# Patient Record
Sex: FEMALE | Race: BLACK OR AFRICAN AMERICAN
[De-identification: names, ages, dates, MRNs, and addresses within clinical notes are randomized per-mention and may not be internally consistent; named-entity substitution may affect disease eponyms.]

---

## 2017-08-24 ENCOUNTER — HOSPITAL ENCOUNTER (EMERGENCY)
Dept: HOSPITAL 23 - CED | Age: 27
Discharge: HOME | End: 2017-08-24
Payer: COMMERCIAL

## 2017-08-24 VITALS — WEIGHT: 189.99 LBS | HEIGHT: 64 IN | BODY MASS INDEX: 32.44 KG/M2

## 2017-08-24 DIAGNOSIS — Z79.2: ICD-10-CM

## 2017-08-24 DIAGNOSIS — Z3A.22: ICD-10-CM

## 2017-08-24 DIAGNOSIS — O99.332: ICD-10-CM

## 2017-08-24 DIAGNOSIS — F17.210: ICD-10-CM

## 2017-08-24 DIAGNOSIS — K80.20: ICD-10-CM

## 2017-08-24 DIAGNOSIS — Z79.899: ICD-10-CM

## 2017-08-24 DIAGNOSIS — O99.612: Primary | ICD-10-CM

## 2017-08-24 DIAGNOSIS — I10: ICD-10-CM

## 2017-08-24 LAB
AMYLASE: 19 U/L (ref 0–46)
BARBITURATES UR QL SCN: 0.2 MG/DL (ref 0.2–2)
BARBITURATES UR QL SCN: 3.4 G/DL (ref 3.5–5)
BASOPHIL#: 0 X10E3 (ref 0–0.3)
BASOPHIL%: 0.5 % (ref 0–2.5)
BENZODIAZ UR QL SCN: 30 U/L (ref 10–40)
BENZODIAZ UR QL SCN: 32 U/L (ref 10–42)
BLOOD UREA NITROGEN: 8 MG/DL (ref 9–23)
BUN/CREATININE RATIO: 13.33
BZE UR QL SCN: 40 U/L (ref 32–92)
CALCIUM SERUM: 8.5 MG/DL (ref 8.4–10.2)
CK MB SERPL-RTO: 14.2 % (ref 11–15.5)
CK MB SERPL-RTO: 33 G/DL (ref 30–36)
CREATININE SERUM: 0.6 MG/DL (ref 0.6–1.4)
DIFF IND: NO
EOSINOPHIL#: 0.1 X10E3 (ref 0–0.7)
EOSINOPHIL%: 1.7 % (ref 0–7)
GENTAMICIN PEAK SERPL-MCNC: NO MG/L
GLOM FILT RATE ESTIMATED: 144.8 ML/MIN (ref 60–?)
GLUCOSE FASTING: 82 MG/DL (ref 70–110)
HEMATOCRIT: 33.8 % (ref 35–45)
HEMOGLOBIN: 11.2 GM/DL (ref 12–16)
KETONES UR QL: 108 MMOL/L (ref 100–111)
KETONES UR QL: 25 MMOL/L (ref 22–31)
LIPASE: 20 U/L (ref 22–51)
LYMPHOCYTE#: 1.7 X10E3 (ref 1–3.5)
LYMPHOCYTE%: 29.9 % (ref 17–45)
MEAN CELL VOLUME: 90.6 FL (ref 83–96)
MEAN CORPUSCULAR HEMOGLOBIN: 29.9 PG (ref 28–34)
MEAN PLATELET VOLUME: 9 FL (ref 6.5–11.5)
MONOCYTE#: 0.5 X10E3 (ref 0–1)
MONOCYTE%: 8 % (ref 3–12)
NEUTROPHIL#: 3.4 X10E3 (ref 1.5–7.1)
NEUTROPHIL%: 59.9 % (ref 40–75)
PLATELET COUNT: 208 X10E3 (ref 140–420)
POTASSIUM: 3.6 MMOL/L (ref 3.5–5.1)
PROTEIN TOTAL SERUM: 6.5 G/DL (ref 6–8.3)
RED BLOOD COUNT: 3.74 X10E (ref 3.9–5.3)
SODIUM: 141 MMOL/L (ref 135–145)
URINE APPEARANCE: CLEAR
URINE BILIRUBIN: (no result)
URINE BLOOD: (no result)
URINE COLOR: YELLOW
URINE GLUCOSE: (no result) MG/DL
URINE KETONE: (no result)
URINE LEUKOCYTE ESTERASE: (no result)
URINE NITRATE: (no result)
URINE PH: 6.5 (ref 5–8)
URINE PROTEIN: (no result)
URINE SOURCE: (no result)
URINE SPECIFIC GRAVITY: 1.01 (ref 1–1.03)
URINE UROBILINOGEN: 0.2 MG/DL
WHITE BLOOD COUNT: 5.7 X10E3 (ref 4–10.5)

## 2017-12-11 ENCOUNTER — ANESTHESIA EVENT (OUTPATIENT)
Dept: LABOR AND DELIVERY | Facility: HOSPITAL | Age: 27
End: 2017-12-11

## 2017-12-11 ENCOUNTER — HOSPITAL ENCOUNTER (INPATIENT)
Facility: HOSPITAL | Age: 27
LOS: 2 days | Discharge: HOME | End: 2017-12-13
Attending: OBSTETRICS & GYNECOLOGY | Admitting: OBSTETRICS & GYNECOLOGY

## 2017-12-11 ENCOUNTER — ANESTHESIA (OUTPATIENT)
Dept: LABOR AND DELIVERY | Facility: HOSPITAL | Age: 27
End: 2017-12-11

## 2017-12-11 DIAGNOSIS — O60.03 PRETERM LABOR IN THIRD TRIMESTER WITHOUT DELIVERY: Primary | ICD-10-CM

## 2017-12-11 PROBLEM — O09.33 INSUFFICIENT PRENATAL CARE IN THIRD TRIMESTER: Status: ACTIVE | Noted: 2017-12-11

## 2017-12-11 PROBLEM — O99.323 DRUG USE AFFECTING PREGNANCY IN THIRD TRIMESTER: Status: ACTIVE | Noted: 2017-12-11

## 2017-12-11 LAB
ABO GROUP BLD: NORMAL
AMPHET+METHAMPHET UR QL: POSITIVE
BARBITURATES UR QL SCN: NEGATIVE
BASOPHILS # BLD AUTO: 0.01 10*3/MM3 (ref 0–0.2)
BASOPHILS NFR BLD AUTO: 0.1 % (ref 0–1.5)
BENZODIAZ UR QL SCN: NEGATIVE
BLD GP AB SCN SERPL QL: NEGATIVE
CANNABINOIDS SERPL QL: POSITIVE
COCAINE UR QL: NEGATIVE
DEPRECATED RDW RBC AUTO: 42 FL (ref 37–54)
EOSINOPHIL # BLD AUTO: 0.01 10*3/MM3 (ref 0–0.7)
EOSINOPHIL NFR BLD AUTO: 0.1 % (ref 0.3–6.2)
ERYTHROCYTE [DISTWIDTH] IN BLOOD BY AUTOMATED COUNT: 13 % (ref 11.7–13)
HBV SURFACE AG SERPL QL IA: NORMAL
HCT VFR BLD AUTO: 36.5 % (ref 35.6–45.5)
HCV AB SER DONR QL: NORMAL
HGB BLD-MCNC: 12.4 G/DL (ref 11.9–15.5)
HIV1 P24 AG SER QL: NORMAL
HIV1+2 AB SER QL: NORMAL
IMM GRANULOCYTES # BLD: 0.02 10*3/MM3 (ref 0–0.03)
IMM GRANULOCYTES NFR BLD: 0.2 % (ref 0–0.5)
LYMPHOCYTES # BLD AUTO: 1.8 10*3/MM3 (ref 0.9–4.8)
LYMPHOCYTES NFR BLD AUTO: 20 % (ref 19.6–45.3)
MCH RBC QN AUTO: 30.3 PG (ref 26.9–32)
MCHC RBC AUTO-ENTMCNC: 34 G/DL (ref 32.4–36.3)
MCV RBC AUTO: 89.2 FL (ref 80.5–98.2)
METHADONE UR QL SCN: NEGATIVE
MONOCYTES # BLD AUTO: 0.66 10*3/MM3 (ref 0.2–1.2)
MONOCYTES NFR BLD AUTO: 7.3 % (ref 5–12)
NEUTROPHILS # BLD AUTO: 6.5 10*3/MM3 (ref 1.9–8.1)
NEUTROPHILS NFR BLD AUTO: 72.3 % (ref 42.7–76)
OPIATES UR QL: POSITIVE
OXYCODONE UR QL SCN: NEGATIVE
PLATELET # BLD AUTO: 256 10*3/MM3 (ref 140–500)
PMV BLD AUTO: 11.3 FL (ref 6–12)
RBC # BLD AUTO: 4.09 10*6/MM3 (ref 3.9–5.2)
RH BLD: POSITIVE
WBC NRBC COR # BLD: 9 10*3/MM3 (ref 4.5–10.7)

## 2017-12-11 PROCEDURE — G0432 EIA HIV-1/HIV-2 SCREEN: HCPCS | Performed by: OBSTETRICS & GYNECOLOGY

## 2017-12-11 PROCEDURE — 80307 DRUG TEST PRSMV CHEM ANLYZR: CPT | Performed by: OBSTETRICS & GYNECOLOGY

## 2017-12-11 PROCEDURE — 87899 AGENT NOS ASSAY W/OPTIC: CPT | Performed by: OBSTETRICS & GYNECOLOGY

## 2017-12-11 PROCEDURE — 85025 COMPLETE CBC W/AUTO DIFF WBC: CPT | Performed by: OBSTETRICS & GYNECOLOGY

## 2017-12-11 PROCEDURE — 59025 FETAL NON-STRESS TEST: CPT

## 2017-12-11 PROCEDURE — 86803 HEPATITIS C AB TEST: CPT | Performed by: OBSTETRICS & GYNECOLOGY

## 2017-12-11 PROCEDURE — 10907ZC DRAINAGE OF AMNIOTIC FLUID, THERAPEUTIC FROM PRODUCTS OF CONCEPTION, VIA NATURAL OR ARTIFICIAL OPENING: ICD-10-PCS | Performed by: OBSTETRICS & GYNECOLOGY

## 2017-12-11 PROCEDURE — 25010000002 PENICILLIN G POTASSIUM PER 600000 UNITS: Performed by: OBSTETRICS & GYNECOLOGY

## 2017-12-11 PROCEDURE — 59409 OBSTETRICAL CARE: CPT | Performed by: OBSTETRICS & GYNECOLOGY

## 2017-12-11 PROCEDURE — 87340 HEPATITIS B SURFACE AG IA: CPT | Performed by: OBSTETRICS & GYNECOLOGY

## 2017-12-11 PROCEDURE — 86850 RBC ANTIBODY SCREEN: CPT | Performed by: OBSTETRICS & GYNECOLOGY

## 2017-12-11 PROCEDURE — C1755 CATHETER, INTRASPINAL: HCPCS | Performed by: ANESTHESIOLOGY

## 2017-12-11 PROCEDURE — 88307 TISSUE EXAM BY PATHOLOGIST: CPT

## 2017-12-11 PROCEDURE — 86901 BLOOD TYPING SEROLOGIC RH(D): CPT | Performed by: OBSTETRICS & GYNECOLOGY

## 2017-12-11 PROCEDURE — 86900 BLOOD TYPING SEROLOGIC ABO: CPT | Performed by: OBSTETRICS & GYNECOLOGY

## 2017-12-11 PROCEDURE — 80081 OBSTETRIC PANEL INC HIV TSTG: CPT | Performed by: OBSTETRICS & GYNECOLOGY

## 2017-12-11 PROCEDURE — 86592 SYPHILIS TEST NON-TREP QUAL: CPT | Performed by: OBSTETRICS & GYNECOLOGY

## 2017-12-11 RX ORDER — ACETAMINOPHEN 325 MG/1
650 TABLET ORAL EVERY 4 HOURS PRN
Status: DISCONTINUED | OUTPATIENT
Start: 2017-12-11 | End: 2017-12-13 | Stop reason: HOSPADM

## 2017-12-11 RX ORDER — DOCUSATE SODIUM 100 MG/1
100 CAPSULE, LIQUID FILLED ORAL 2 TIMES DAILY
Status: DISCONTINUED | OUTPATIENT
Start: 2017-12-11 | End: 2017-12-13 | Stop reason: HOSPADM

## 2017-12-11 RX ORDER — FAMOTIDINE 10 MG/ML
20 INJECTION, SOLUTION INTRAVENOUS EVERY 12 HOURS SCHEDULED
Status: DISCONTINUED | OUTPATIENT
Start: 2017-12-11 | End: 2017-12-11 | Stop reason: HOSPADM

## 2017-12-11 RX ORDER — ACETAMINOPHEN 325 MG/1
650 TABLET ORAL EVERY 6 HOURS PRN
COMMUNITY
End: 2017-12-13 | Stop reason: HOSPADM

## 2017-12-11 RX ORDER — DIPHENHYDRAMINE HCL 25 MG
25 CAPSULE ORAL NIGHTLY PRN
Status: DISCONTINUED | OUTPATIENT
Start: 2017-12-11 | End: 2017-12-11 | Stop reason: HOSPADM

## 2017-12-11 RX ORDER — LIDOCAINE HYDROCHLORIDE AND EPINEPHRINE 15; 5 MG/ML; UG/ML
INJECTION, SOLUTION EPIDURAL AS NEEDED
Status: DISCONTINUED | OUTPATIENT
Start: 2017-12-11 | End: 2017-12-11 | Stop reason: SURG

## 2017-12-11 RX ORDER — IBUPROFEN 800 MG/1
800 TABLET ORAL EVERY 8 HOURS
Status: DISCONTINUED | OUTPATIENT
Start: 2017-12-11 | End: 2017-12-13 | Stop reason: HOSPADM

## 2017-12-11 RX ORDER — OXYTOCIN/RINGER'S LACTATE 30/500 ML
125 PLASTIC BAG, INJECTION (ML) INTRAVENOUS CONTINUOUS PRN
Status: DISCONTINUED | OUTPATIENT
Start: 2017-12-11 | End: 2017-12-11

## 2017-12-11 RX ORDER — CARBOPROST TROMETHAMINE 250 UG/ML
250 INJECTION, SOLUTION INTRAMUSCULAR AS NEEDED
Status: DISCONTINUED | OUTPATIENT
Start: 2017-12-11 | End: 2017-12-13 | Stop reason: HOSPADM

## 2017-12-11 RX ORDER — ONDANSETRON 4 MG/1
4 TABLET, FILM COATED ORAL EVERY 6 HOURS PRN
Status: DISCONTINUED | OUTPATIENT
Start: 2017-12-11 | End: 2017-12-11 | Stop reason: HOSPADM

## 2017-12-11 RX ORDER — SODIUM CHLORIDE 0.9 % (FLUSH) 0.9 %
1-10 SYRINGE (ML) INJECTION AS NEEDED
Status: DISCONTINUED | OUTPATIENT
Start: 2017-12-11 | End: 2017-12-11 | Stop reason: HOSPADM

## 2017-12-11 RX ORDER — LANOLIN 100 %
OINTMENT (GRAM) TOPICAL
Status: DISCONTINUED | OUTPATIENT
Start: 2017-12-11 | End: 2017-12-13 | Stop reason: HOSPADM

## 2017-12-11 RX ORDER — OXYTOCIN/RINGER'S LACTATE 30/500 ML
PLASTIC BAG, INJECTION (ML) INTRAVENOUS
Status: DISPENSED
Start: 2017-12-11 | End: 2017-12-12

## 2017-12-11 RX ORDER — LIDOCAINE HYDROCHLORIDE 15 MG/ML
INJECTION, SOLUTION EPIDURAL; INFILTRATION; INTRACAUDAL; PERINEURAL AS NEEDED
Status: DISCONTINUED | OUTPATIENT
Start: 2017-12-11 | End: 2017-12-11 | Stop reason: SURG

## 2017-12-11 RX ORDER — ACETAMINOPHEN 325 MG/1
650 TABLET ORAL EVERY 4 HOURS PRN
Status: DISCONTINUED | OUTPATIENT
Start: 2017-12-11 | End: 2017-12-11 | Stop reason: HOSPADM

## 2017-12-11 RX ORDER — ONDANSETRON 4 MG/1
4 TABLET, ORALLY DISINTEGRATING ORAL EVERY 6 HOURS PRN
Status: DISCONTINUED | OUTPATIENT
Start: 2017-12-11 | End: 2017-12-13 | Stop reason: HOSPADM

## 2017-12-11 RX ORDER — BISACODYL 10 MG
10 SUPPOSITORY, RECTAL RECTAL DAILY PRN
Status: DISCONTINUED | OUTPATIENT
Start: 2017-12-12 | End: 2017-12-13 | Stop reason: HOSPADM

## 2017-12-11 RX ORDER — METHYLERGONOVINE MALEATE 0.2 MG/ML
200 INJECTION INTRAVENOUS ONCE AS NEEDED
Status: DISCONTINUED | OUTPATIENT
Start: 2017-12-11 | End: 2017-12-11 | Stop reason: SDUPTHER

## 2017-12-11 RX ORDER — MISOPROSTOL 200 UG/1
800 TABLET ORAL AS NEEDED
Status: DISCONTINUED | OUTPATIENT
Start: 2017-12-11 | End: 2017-12-13 | Stop reason: HOSPADM

## 2017-12-11 RX ORDER — ERYTHROMYCIN 5 MG/G
OINTMENT OPHTHALMIC
Status: DISPENSED
Start: 2017-12-11 | End: 2017-12-12

## 2017-12-11 RX ORDER — LIDOCAINE HYDROCHLORIDE 10 MG/ML
5 INJECTION, SOLUTION INFILTRATION; PERINEURAL AS NEEDED
Status: DISCONTINUED | OUTPATIENT
Start: 2017-12-11 | End: 2017-12-11 | Stop reason: HOSPADM

## 2017-12-11 RX ORDER — TERBUTALINE SULFATE 1 MG/ML
0.25 INJECTION, SOLUTION SUBCUTANEOUS AS NEEDED
Status: DISCONTINUED | OUTPATIENT
Start: 2017-12-11 | End: 2017-12-11 | Stop reason: HOSPADM

## 2017-12-11 RX ORDER — DIPHENHYDRAMINE HYDROCHLORIDE 50 MG/ML
25 INJECTION INTRAMUSCULAR; INTRAVENOUS NIGHTLY PRN
Status: DISCONTINUED | OUTPATIENT
Start: 2017-12-11 | End: 2017-12-11 | Stop reason: HOSPADM

## 2017-12-11 RX ORDER — OXYTOCIN/RINGER'S LACTATE 30/500 ML
999 PLASTIC BAG, INJECTION (ML) INTRAVENOUS ONCE
Status: DISCONTINUED | OUTPATIENT
Start: 2017-12-11 | End: 2017-12-11

## 2017-12-11 RX ORDER — ONDANSETRON 2 MG/ML
4 INJECTION INTRAMUSCULAR; INTRAVENOUS EVERY 6 HOURS PRN
Status: DISCONTINUED | OUTPATIENT
Start: 2017-12-11 | End: 2017-12-13 | Stop reason: HOSPADM

## 2017-12-11 RX ORDER — ONDANSETRON 2 MG/ML
4 INJECTION INTRAMUSCULAR; INTRAVENOUS EVERY 6 HOURS PRN
Status: DISCONTINUED | OUTPATIENT
Start: 2017-12-11 | End: 2017-12-11 | Stop reason: HOSPADM

## 2017-12-11 RX ORDER — HYDROCODONE BITARTRATE AND ACETAMINOPHEN 5; 325 MG/1; MG/1
1 TABLET ORAL EVERY 4 HOURS PRN
Status: DISCONTINUED | OUTPATIENT
Start: 2017-12-11 | End: 2017-12-13 | Stop reason: HOSPADM

## 2017-12-11 RX ORDER — ONDANSETRON 4 MG/1
4 TABLET, FILM COATED ORAL EVERY 6 HOURS PRN
Status: DISCONTINUED | OUTPATIENT
Start: 2017-12-11 | End: 2017-12-13 | Stop reason: HOSPADM

## 2017-12-11 RX ORDER — PHYTONADIONE 1 MG/.5ML
INJECTION, EMULSION INTRAMUSCULAR; INTRAVENOUS; SUBCUTANEOUS
Status: DISPENSED
Start: 2017-12-11 | End: 2017-12-12

## 2017-12-11 RX ORDER — PNV NO.95/FERROUS FUM/FOLIC AC 28MG-0.8MG
TABLET ORAL
COMMUNITY

## 2017-12-11 RX ORDER — ONDANSETRON 4 MG/1
4 TABLET, ORALLY DISINTEGRATING ORAL EVERY 6 HOURS PRN
Status: DISCONTINUED | OUTPATIENT
Start: 2017-12-11 | End: 2017-12-11 | Stop reason: HOSPADM

## 2017-12-11 RX ORDER — FAMOTIDINE 20 MG/1
20 TABLET, FILM COATED ORAL EVERY 12 HOURS SCHEDULED
Status: DISCONTINUED | OUTPATIENT
Start: 2017-12-11 | End: 2017-12-11 | Stop reason: HOSPADM

## 2017-12-11 RX ORDER — HYDROCODONE BITARTRATE AND ACETAMINOPHEN 5; 325 MG/1; MG/1
2 TABLET ORAL EVERY 4 HOURS PRN
Status: DISCONTINUED | OUTPATIENT
Start: 2017-12-11 | End: 2017-12-13 | Stop reason: HOSPADM

## 2017-12-11 RX ORDER — ZOLPIDEM TARTRATE 5 MG/1
5 TABLET ORAL NIGHTLY PRN
Status: DISCONTINUED | OUTPATIENT
Start: 2017-12-11 | End: 2017-12-13 | Stop reason: HOSPADM

## 2017-12-11 RX ORDER — SODIUM CHLORIDE, SODIUM LACTATE, POTASSIUM CHLORIDE, CALCIUM CHLORIDE 600; 310; 30; 20 MG/100ML; MG/100ML; MG/100ML; MG/100ML
125 INJECTION, SOLUTION INTRAVENOUS CONTINUOUS
Status: DISCONTINUED | OUTPATIENT
Start: 2017-12-11 | End: 2017-12-11

## 2017-12-11 RX ORDER — PENICILLIN G 3000000 [IU]/50ML
3 INJECTION, SOLUTION INTRAVENOUS EVERY 4 HOURS
Status: DISCONTINUED | OUTPATIENT
Start: 2017-12-11 | End: 2017-12-11 | Stop reason: HOSPADM

## 2017-12-11 RX ORDER — SODIUM CHLORIDE 0.9 % (FLUSH) 0.9 %
1-10 SYRINGE (ML) INJECTION AS NEEDED
Status: DISCONTINUED | OUTPATIENT
Start: 2017-12-11 | End: 2017-12-13 | Stop reason: HOSPADM

## 2017-12-11 RX ORDER — METHYLERGONOVINE MALEATE 0.2 MG/ML
200 INJECTION INTRAVENOUS ONCE AS NEEDED
Status: DISCONTINUED | OUTPATIENT
Start: 2017-12-11 | End: 2017-12-13 | Stop reason: HOSPADM

## 2017-12-11 RX ADMIN — LIDOCAINE HYDROCHLORIDE 5 ML: 15 INJECTION, SOLUTION EPIDURAL; INFILTRATION; INTRACAUDAL; PERINEURAL at 13:41

## 2017-12-11 RX ADMIN — LIDOCAINE HYDROCHLORIDE 5 ML: 15 INJECTION, SOLUTION EPIDURAL; INFILTRATION; INTRACAUDAL; PERINEURAL at 13:37

## 2017-12-11 RX ADMIN — HYDROCODONE BITARTRATE AND ACETAMINOPHEN 2 TABLET: 5; 325 TABLET ORAL at 21:29

## 2017-12-11 RX ADMIN — LIDOCAINE HYDROCHLORIDE 5 ML: 15 INJECTION, SOLUTION EPIDURAL; INFILTRATION; INTRACAUDAL; PERINEURAL at 13:44

## 2017-12-11 RX ADMIN — SODIUM CHLORIDE 5 MILLION UNITS: 900 INJECTION INTRAVENOUS at 13:25

## 2017-12-11 RX ADMIN — LIDOCAINE HYDROCHLORIDE AND EPINEPHRINE 3 ML: 15; 5 INJECTION, SOLUTION EPIDURAL at 13:34

## 2017-12-11 RX ADMIN — IBUPROFEN 800 MG: 800 TABLET ORAL at 18:13

## 2017-12-11 RX ADMIN — DOCUSATE SODIUM 100 MG: 100 CAPSULE, LIQUID FILLED ORAL at 18:13

## 2017-12-11 RX ADMIN — HYDROCODONE BITARTRATE AND ACETAMINOPHEN 2 TABLET: 5; 325 TABLET ORAL at 16:50

## 2017-12-11 NOTE — H&P
CC: Contractions   History of present illness: Patient presents complaining of painful contractions.  Patient reports that she has not seen a doctor for about the last 4 weeks.  She reports that she was dismissed from the care of Dr. Marek Hurt, due to noncompliance with her appointments.  Our service is on call for unreferred patient that present in emergency situations.  In reviewing the Epic system both here and at [facility]; however, I am unable to see the patient actually had any visits with Dr. Hurt.  The only encounters noted in the [facility] system are for the patient's treatment of opioid withdrawal by Dr. Chintan archibald.  The patient initially did not discuss any of these issues, but upon further questioning she did immediate to marijuana and drug use earlier in the pregnancy.  She reports that she only took Subutex for about a week.  She reports that she has not taken any Subutex or any other illicit substances since then.    OB History    Para Term  AB Living   3 2 2   2   SAB TAB Ectopic Multiple Live Births       2      # Outcome Date GA Lbr Osmani/2nd Weight Sex Delivery Anes PTL Lv   3 Current            2 Term 12 41w0d  3572 g (7 lb 14 oz) F Vag-Spont EPI N YON   1 Term 01/02/10 38w0d  3289 g (7 lb 4 oz) M Vag-Spont EPI N YON        Med History: Unclear.  In reviewing the records from [facility], the patient had previously taking lisinopril for chronic hypertension and metformin for diabetes, but she reports that she has not needed them during this pregnancy.  She does appear to have a likely history of chronic history and diabetes.    History reviewed. No pertinent surgical history.     History reviewed. No pertinent family history.    Social History   Substance Use Topics   • Smoking status: Current Every Day Smoker     Packs/day: 0.50   • Smokeless tobacco: None   • Alcohol use No     No current facility-administered medications on file prior to encounter.       No current outpatient prescriptions on file prior to encounter.     No Known Allergies     ROS:   General: No fever or chills  Constitutional: No weight loss or gain, no hair loss  HENT: No headache, no hearing loss, no tinnitus  Eyes: normal vision, no eye pain  Lungs: No cough, no shortness of breath  Heart: No chest pain, no palpitations  Abdomen: No nausea, vomiting, constipation or diarrhea  : No dysuria, no hematuria  Skin: No rashes  Lymph: No swelling  Neuro: No parathesia, no weakness  Psych: Normal though content, no hallucinations, no SI/HI    PE:   Vitals:    17 1355 17 1400 17 1405 17 1410   BP: 120/54 121/52 129/57 130/63   BP Location:    Right arm   Patient Position:    Sitting   Pulse: 83 88 90 92   Resp:    16   Temp:    97.5 °F (36.4 °C)   TempSrc:    Oral   Height:       General: Patient in significant pain with contractions, awake and oriented ×3  Lungs: Clear to auscultation bilaterally  Cardiovascular: Regular rate and rhythm  Abdomen: Soft, nontender, nondistended, normoactive bowel sounds  Cervix: 9 cm dilated, 90% effaced, -1 station  Extremities: No Tenderness, no Homans sign, trace lower extremity edema    Assessment:  1.  27-year-old  3 para 1 at 36-6/7 weeks gestational age by an 8 week ultrasound at [facility] in active  labor  2.  Insufficient prenatal care  3.  Drug use during pregnancy  4.  GBS unknown  5.  Fetal status reassuring  6.  Possible history of chronic hypertension and diabetes    Plan:  1.  The patient is admitted for active labor.  She appears to be progressing rapidly through labor.  We're attempting to obtain an epidural as per the patient's request.  I have started penicillin for unknown GBS status and gestational age of 36-6/7 weeks.  I attempted to order betamethasone; however, I do not suspect it'll be time to administer this medication and get any significant benefits.  Anticipate vaginal delivery.

## 2017-12-11 NOTE — OP NOTE
Delivery Note    Obstetrician:   Sven Cuevas MD      Pre-Delivery Diagnosis:labor at 36-6/7 weeks    Post-Delivery Diagnosis: Same    Procedure: Spontaneous vaginal delivery         Indications for instrumental delivery: none    Infant Wt:    5lbs,  10oz.          Apgars: 1' 8  /  5' 9     Placenta and Cord:          Mechanism: spontaneous        Description:  complete    Estimated Blood Loss:  300 mL                             Complications:  None           Condition: Stable    27-year-old  3 para 1 presents at 36-6/7 weeks in active labor.  Her course is complicated by drug abuse.  She reports using Suboxone, alcohol and marijuana.  She reports that none of these are been used recently.  Her course is further complicated by minimal prenatal care      The patient presented in active labor.  She was admitted and an epidural catheter was placed.  Her first dose of penicillin G for unknown group B strep status was given.  At this point, the patient felt the resistant urge to push.  She was noted to be completely dilated and completely effaced with the bag of davis at the introitus.  I was called, as the patient's on-call physician was performing  and could not attend the delivery.  The perineum was prepped and draped in the usual sterile fashion and I performed an amniotomy.  Meconium-stained fluid was released      The patient then pushed to spontaneous vaginal delivery of the fetal head from direct occiput anterior presentation.  The mouth and naris were suctioned with a bulb on the perineum.  The shoulders were delivered without difficulty followed by the remainder of the infant.  30 seconds of delay was practiced followed by clamping of the umbilical cord.  The cord was divided and the infant was handed to the waiting  team who were present at my request.  This is a vigorous male weighing 5 lbs. 10 oz.      The placenta  spontaneously.  It was intact and had a three-vessel  cord.  The placenta was sent to pathology as a specimen.      The perineum was inspected.  It was intact, as was the cervix.  Estimated blood loss 300 cc  12/11/2017  Sven Cuevas MD

## 2017-12-11 NOTE — ANESTHESIA PREPROCEDURE EVALUATION
Anesthesia Evaluation     history of anesthetic complications:  NPO Solid Status: > 8 hours       Airway   Mallampati: II  Dental      Pulmonary    Cardiovascular - normal exam        Neuro/Psych  GI/Hepatic/Renal/Endo      Musculoskeletal     Abdominal    Substance History   (+) alcohol use, drug use     OB/GYN    (+) Pregnant,         Other                                        Anesthesia Plan    ASA 3 - emergent     epidural     Anesthetic plan and risks discussed with patient.

## 2017-12-11 NOTE — PLAN OF CARE
Problem: Patient Care Overview (Adult)  Goal: Plan of Care Review  Outcome: Ongoing (interventions implemented as appropriate)    17 1500   Coping/Psychosocial Response Interventions   Plan Of Care Reviewed With patient   Patient Care Overview   Progress no change   Outcome Evaluation   Outcome Summary/Follow up Plan Successful vaginal birth; up to PP       Goal: Adult Individualization and Mutuality  Outcome: Ongoing (interventions implemented as appropriate)    Problem: Postpartum, Vaginal Delivery (Adult)  Goal: Signs and Symptoms of Listed Potential Problems Will be Absent or Manageable (Postpartum, Vaginal Delivery)  Outcome: Ongoing (interventions implemented as appropriate)    Problem: Fall Risk  (Adult,Obstetrics,Pediatric)  Goal: Identify Related Risk Factors and Signs and Symptoms  Outcome: Ongoing (interventions implemented as appropriate)  Goal: Absence of Maternal Fall  Outcome: Ongoing (interventions implemented as appropriate)  Goal: Absence of [place] Fall/Drop  Outcome: Ongoing (interventions implemented as appropriate)

## 2017-12-12 LAB
BASOPHILS # BLD AUTO: 0.01 10*3/MM3 (ref 0–0.2)
BASOPHILS NFR BLD AUTO: 0.1 % (ref 0–1.5)
DEPRECATED RDW RBC AUTO: 43.2 FL (ref 37–54)
EOSINOPHIL # BLD AUTO: 0.04 10*3/MM3 (ref 0–0.7)
EOSINOPHIL NFR BLD AUTO: 0.5 % (ref 0.3–6.2)
ERYTHROCYTE [DISTWIDTH] IN BLOOD BY AUTOMATED COUNT: 12.7 % (ref 11.7–13)
HCT VFR BLD AUTO: 32.7 % (ref 35.6–45.5)
HGB BLD-MCNC: 10.6 G/DL (ref 11.9–15.5)
IMM GRANULOCYTES # BLD: 0 10*3/MM3 (ref 0–0.03)
IMM GRANULOCYTES NFR BLD: 0 % (ref 0–0.5)
LYMPHOCYTES # BLD AUTO: 2.66 10*3/MM3 (ref 0.9–4.8)
LYMPHOCYTES NFR BLD AUTO: 31.1 % (ref 19.6–45.3)
MCH RBC QN AUTO: 29.9 PG (ref 26.9–32)
MCHC RBC AUTO-ENTMCNC: 32.4 G/DL (ref 32.4–36.3)
MCV RBC AUTO: 92.1 FL (ref 80.5–98.2)
MONOCYTES # BLD AUTO: 0.73 10*3/MM3 (ref 0.2–1.2)
MONOCYTES NFR BLD AUTO: 8.5 % (ref 5–12)
NEUTROPHILS # BLD AUTO: 5.11 10*3/MM3 (ref 1.9–8.1)
NEUTROPHILS NFR BLD AUTO: 59.8 % (ref 42.7–76)
PLATELET # BLD AUTO: 205 10*3/MM3 (ref 140–500)
PMV BLD AUTO: 11.6 FL (ref 6–12)
RBC # BLD AUTO: 3.55 10*6/MM3 (ref 3.9–5.2)
RPR SER QL: NORMAL
WBC NRBC COR # BLD: 8.55 10*3/MM3 (ref 4.5–10.7)

## 2017-12-12 PROCEDURE — 99231 SBSQ HOSP IP/OBS SF/LOW 25: CPT | Performed by: OBSTETRICS & GYNECOLOGY

## 2017-12-12 PROCEDURE — 85025 COMPLETE CBC W/AUTO DIFF WBC: CPT | Performed by: OBSTETRICS & GYNECOLOGY

## 2017-12-12 PROCEDURE — 90791 PSYCH DIAGNOSTIC EVALUATION: CPT | Performed by: SOCIAL WORKER

## 2017-12-12 RX ADMIN — DOCUSATE SODIUM 100 MG: 100 CAPSULE, LIQUID FILLED ORAL at 10:12

## 2017-12-12 RX ADMIN — HYDROCODONE BITARTRATE AND ACETAMINOPHEN 2 TABLET: 5; 325 TABLET ORAL at 11:32

## 2017-12-12 RX ADMIN — IBUPROFEN 800 MG: 800 TABLET ORAL at 02:34

## 2017-12-12 RX ADMIN — IBUPROFEN 800 MG: 800 TABLET ORAL at 10:16

## 2017-12-12 RX ADMIN — HYDROCODONE BITARTRATE AND ACETAMINOPHEN 2 TABLET: 5; 325 TABLET ORAL at 06:28

## 2017-12-12 RX ADMIN — HYDROCODONE BITARTRATE AND ACETAMINOPHEN 2 TABLET: 5; 325 TABLET ORAL at 21:30

## 2017-12-12 RX ADMIN — IBUPROFEN 800 MG: 800 TABLET ORAL at 21:30

## 2017-12-12 RX ADMIN — DOCUSATE SODIUM 100 MG: 100 CAPSULE, LIQUID FILLED ORAL at 21:30

## 2017-12-12 RX ADMIN — HYDROCODONE BITARTRATE AND ACETAMINOPHEN 2 TABLET: 5; 325 TABLET ORAL at 16:11

## 2017-12-12 RX ADMIN — HYDROCODONE BITARTRATE AND ACETAMINOPHEN 2 TABLET: 5; 325 TABLET ORAL at 01:18

## 2017-12-12 NOTE — PROGRESS NOTES
Postpartum Progress Note      Status post Vaginal Delivery: Doing well postoperatively.     History of drug use--UDS positive on admission for amphetamines, opioids, and THC.  Patient state she wants to get in with a drug treatment program.  MSW consulted.  Continue postpartum care.  Anticipate discharge home tomorrow.    Rh status: B positive  Rubella: pending  Gender: male--in NICU for CLARISSA    Subjective     Postpartum Day 1: Vaginal delivery    The patient feels well. The patient denies emotional concerns. Pain is well controlled with current medications. The baby is well. The patient is ambulating well. The patient is tolerating a normal diet.     Objective     Vital signs in last 24 hours:  Temp:  [97.3 °F (36.3 °C)-98.2 °F (36.8 °C)] 98.2 °F (36.8 °C)  Heart Rate:  [] 85  Resp:  [16] 16  BP: (119-169)/(52-97) 122/76      General:    alert, appears stated age and cooperative   Abdomen:  Soft, Non-tender    Lochia:  appropriate   Uterine Fundus:   firm   Ext    Edema trace   DVT Evaluation:  No evidence of DVT seen on physical exam.     Lab Results   Component Value Date    WBC 8.55 12/12/2017    HGB 10.6 (L) 12/12/2017    HCT 32.7 (L) 12/12/2017    MCV 92.1 12/12/2017     12/12/2017       Kari Phan MD  12/12/2017  10:11 AM

## 2017-12-12 NOTE — PROGRESS NOTES
Discharge Planning Assessment  [facility]     Patient Name: Ariadne Haley  MRN: [id number]  Today's Date: 12/12/2017    Admit Date: 12/11/2017          Discharge Needs Assessment       12/12/17 9609    Living Environment    Lives With grandparent(s);child(rico), dependent    Living Arrangements house            Discharge Plan       12/12/17 1353    Case Management/Social Work Plan    Plan CPS report made; will follow CPS recommendations    Patient/Family In Agreement With Plan yes    Additional Comments Mother is Ariadne Haley [phone]. Baby is baby boy (Jonelle Haley) [phone]. Met with mother alone bedside. CSW Nellie Wolff also present. Confirmed facesheet correct. Patients grandmother has other bracelet. Mother doesn't want to list FOB. Consult to see mother for positive drug screens. Mother drug UA positive for opiates, amphetamines and THC. Baby UA positive for opiates and amphetamines. Mother reports she has a 5 year old and 7 year old that she has custody of at home. Denies any past CPS involvement. Spoke with Tania at CPS hotline no active case. Mother reports she has car seat and crib. Mother doesn't have WIC and plans to bottle feed, WIC location information provided along with other community resource list. Mother admits to drinking 2 pints of ETOH until 28 weeks. Mother admits to taking Lortab and she reports she has prescription from Dr. Snider, cannot verify. Denies amphetamine use. Asked RN for access consult, pt agreeable. CPS report made, report number [id number] spoke with Petra. Faxed positive drug screen to [phone]. No meconium to follow. Will follow CPS recommendations. MYRNA Rachel        Discharge Placement     No information found                Demographic Summary       12/12/17 4922    Referral Information    Admission Type inpatient    Reason For Consult mother/baby    Contact Information    Permission Granted to Share Information With facility ;family/designee             Functional Status       12/12/17 5551    Functional Status Current    Ambulation 0-->independent    Transferring 0-->independent    Toileting 0-->independent    Bathing 0-->independent    Dressing 0-->independent    Eating 0-->independent    Communication 0-->understands/communicates without difficulty    Swallowing (if score 2 or more for any item, consult Rehab Services) 0-->swallows foods/liquids without difficulty    Functional Status Prior    Ambulation 0-->independent    Transferring 0-->independent    Toileting 0-->independent    Bathing 0-->independent    Dressing 0-->independent    Eating 0-->independent    Communication 0-->understands/communicates without difficulty    Swallowing 0-->swallows foods/liquids without difficulty            Psychosocial     None            Abuse/Neglect     None            Legal     None            Substance Abuse     None            Patient Forms     None          Tiffany Barkley

## 2017-12-12 NOTE — CONSULTS
28 y/o single mother of 3 children. [facility] seeing patient due to + UDS for amphetamine/methamphetamine, THC and opiates.  Patient states she has never used meth. Amphetamine. She admits to using Heroin x 2 or 3 in Feb 2017.  She states she has a prescription from Dr. Snider for Lortab for her nerve pain.   She also reported a prescription for Klonipin from Dr. Snider.  [facility] for Lortab was filled 6/30/2017.  The last for Klonopin was filled 7/14. She denied taking the medications daily.  Per chart she has had poor prenatal care.  Patient admits to regular marijuana use.  She reports that use was more frequently prior to pregnancy.  She states last use was 1 wk ago.  Patient admits near daily use of ETOH prior to pregnancy. She states that her last ETOH was 9/2017. She states she cut down quantity from 3-4 bourbon drinks/day.  She denies any hx of w/ drawal.  No hx of SI or HI.  No hx of wanting to die.    Patient denies any prior DONOVAN tx. She has not seen a mental health professional. No IP psych tx.  She is prescribed Klonopin by her PCP.    Patient lives w/ her maternal uncle. Her 2 oldest children live a/ her grandmother. She works at a [employer] center when she works. She has some college.     Patient is alert and O x 4.  No SI or HI.  No a/v hallucinations.  She was pleasant and engaging.  She denies interest in stopping her marijuana use and denies having a drug problem as she denies using meth and has only used Heroin x 2.  KASKARLA indicates it has been several months since she has gotten any Klonopin and Lortab yet she was positive for Opiates.  Patient was provided w/ a list of DONOVAN treatment options should she decide to seek treatment.  No further need for [facility] involvement.

## 2017-12-12 NOTE — PLAN OF CARE
Problem: Patient Care Overview (Adult)  Goal: Plan of Care Review  Outcome: Ongoing (interventions implemented as appropriate)    17 0223   Coping/Psychosocial Response Interventions   Plan Of Care Reviewed With patient   Patient Care Overview   Progress improving   Outcome Evaluation   Outcome Summary/Follow up Plan V/S stable, uterus and bleeding WNL, pain controlled with PO pain meds, visited infant in NICU       Goal: Adult Individualization and Mutuality  Outcome: Ongoing (interventions implemented as appropriate)  Goal: Discharge Needs Assessment  Outcome: Ongoing (interventions implemented as appropriate)    Problem: Postpartum, Vaginal Delivery (Adult)  Goal: Signs and Symptoms of Listed Potential Problems Will be Absent or Manageable (Postpartum, Vaginal Delivery)  Outcome: Ongoing (interventions implemented as appropriate)    Problem: Fall Risk  (Adult,Obstetrics,Pediatric)  Goal: Identify Related Risk Factors and Signs and Symptoms  Outcome: Ongoing (interventions implemented as appropriate)  Goal: Absence of Maternal Fall  Outcome: Ongoing (interventions implemented as appropriate)  Goal: Absence of [place] Fall/Drop  Outcome: Ongoing (interventions implemented as appropriate)    Problem: Substance Use,  (Adult,Obstetrics,Pediatric)  Goal: Identify Related Risk Factors and Signs and Symptoms  Outcome: Ongoing (interventions implemented as appropriate)  Goal: Integration of Healthy Physical/Emotional/Social Lifestyle  Outcome: Ongoing (interventions implemented as appropriate)

## 2017-12-13 VITALS
RESPIRATION RATE: 20 BRPM | DIASTOLIC BLOOD PRESSURE: 82 MMHG | SYSTOLIC BLOOD PRESSURE: 134 MMHG | HEART RATE: 80 BPM | TEMPERATURE: 98.2 F | HEIGHT: 64 IN

## 2017-12-13 LAB — RUBV IGG SERPL IA-ACNC: POSITIVE

## 2017-12-13 PROCEDURE — 99239 HOSP IP/OBS DSCHRG MGMT >30: CPT | Performed by: OBSTETRICS & GYNECOLOGY

## 2017-12-13 RX ORDER — IBUPROFEN 800 MG/1
800 TABLET ORAL EVERY 8 HOURS
Qty: 30 TABLET | Refills: 1 | Status: SHIPPED | OUTPATIENT
Start: 2017-12-13

## 2017-12-13 RX ADMIN — HYDROCODONE BITARTRATE AND ACETAMINOPHEN 2 TABLET: 5; 325 TABLET ORAL at 08:28

## 2017-12-13 RX ADMIN — IBUPROFEN 800 MG: 800 TABLET ORAL at 08:28

## 2017-12-13 RX ADMIN — DOCUSATE SODIUM 100 MG: 100 CAPSULE, LIQUID FILLED ORAL at 08:27

## 2017-12-13 RX ADMIN — HYDROCODONE BITARTRATE AND ACETAMINOPHEN 2 TABLET: 5; 325 TABLET ORAL at 02:58

## 2017-12-13 RX ADMIN — HYDROCODONE BITARTRATE AND ACETAMINOPHEN 2 TABLET: 5; 325 TABLET ORAL at 13:20

## 2017-12-13 NOTE — PLAN OF CARE
Problem: Patient Care Overview (Adult)  Goal: Plan of Care Review  Outcome: Ongoing (interventions implemented as appropriate)    17   Coping/Psychosocial Response Interventions   Plan Of Care Reviewed With --  patient   Patient Care Overview   Progress --  improving   Outcome Evaluation   Outcome Summary/Follow up Plan V/S stable, uterus and bleeding WNL, pain controlled with PO pain meds, visited infant in NICU --        Goal: Adult Individualization and Mutuality  Outcome: Ongoing (interventions implemented as appropriate)    17   Individualization   Patient Specific Preferences pain control    Patient Specific Goals See baby in NICU       Goal: Discharge Needs Assessment  Outcome: Ongoing (interventions implemented as appropriate)    17   Discharge Needs Assessment   Concerns To Be Addressed substance/tobacco abuse/use concerns   Readmission Within The Last 30 Days no previous admission in last 30 days   Equipment Needed After Discharge none   Current Discharge Risk substance abuse   Discharge Disposition home or self-care   Current Health   Anticipated Changes Related to Illness none   Self-Care   Equipment Currently Used at Home none   Living Environment   Transportation Available car         Problem: Postpartum, Vaginal Delivery (Adult)  Goal: Signs and Symptoms of Listed Potential Problems Will be Absent or Manageable (Postpartum, Vaginal Delivery)  Outcome: Ongoing (interventions implemented as appropriate)    17   Postpartum, Vaginal Delivery   Problems Assessed (Postpartum Vaginal Delivery) all   Problems Present (Postpartum Vaginal Delivery) none         Problem: Fall Risk  (Adult,Obstetrics,Pediatric)  Goal: Identify Related Risk Factors and Signs and Symptoms  Outcome: Ongoing (interventions implemented as appropriate)    17   Fall Risk    Fall Risk: Related Risk Factors fatigue;balance change;sleep  disturbance;medication side effects    Fall Risk: Signs and Symptoms presence of fall risk factors       Goal: Absence of Maternal Fall  Outcome: Ongoing (interventions implemented as appropriate)    17 0621   Fall Risk  (Adult,Obstetrics,Pediatric)   Absence of Maternal Fall making progress toward outcome       Goal: Absence of [place] Fall/Drop  Outcome: Ongoing (interventions implemented as appropriate)    17   Fall Risk  (Adult,Obstetrics,Pediatric)   Absence of  Fall/Drop making progress toward outcome         Problem: Substance Use,  (Adult,Obstetrics,Pediatric)  Goal: Identify Related Risk Factors and Signs and Symptoms  Outcome: Ongoing (interventions implemented as appropriate)    17   Substance Use,     Substance Use: Related Risk Factors noncompliant with care;substance use/abuse

## 2017-12-13 NOTE — PROGRESS NOTES
Continued Stay Note  [facility]     Patient Name: Ariadne Haley  MRN: [id number]  Today's Date: 12/13/2017    Admit Date: 12/11/2017          Discharge Plan       12/13/17 0858    Case Management/Social Work Plan    Plan Follow for CPS recommendations     Patient/Family In Agreement With Plan yes    Additional Comments Spoke with Tania/CPS hotline; case was accepted.  has not been assigned; supervisor is Taylor Ortega [phone]; CCP left voicemail. Follow for  assigned. Nellie NORRIS               Discharge Codes     None            MYRNA Aparicio

## 2017-12-13 NOTE — PROGRESS NOTES
Continued Stay Note  [facility]     Patient Name: Ariadne Haley  MRN: [id number]  Today's Date: 12/13/2017    Admit Date: 12/11/2017          Discharge Plan       12/13/17 1357    Case Management/Social Work Plan    Plan Prevention plan on chart. Baby to d/c to maternal great grandmother Sharron Maynard    Patient/Family In Agreement With Plan yes    Additional Comments Met with CPS worker Ethel Andrade [phone] at the hospital. She reports baby will be discharged to baby's maternal great grandmother Sharron Maynard until further notice. Prevention plan placed on chart. CCP to follow…MYRNA Rachel              Discharge Codes     None        Expected Discharge Date and Time     Expected Discharge Date Expected Discharge Time    Dec 13, 2017             Tiffany Barkley

## 2017-12-13 NOTE — PROGRESS NOTES
"Subjective:    Cc:  Postpartum    Postpartum Day 2:     The patient feels well.  Pain is well controlled with current medications. The baby is in NICU for evidence of CLARISSA.  Urinary output is adequate. The patient is ambulating well. The patient is tolerating a normal diet. Flatus has been passed.    Objective:    Vital signs in last 24 hours:  Blood pressure 134/82, pulse 80, temperature 98.2 °F (36.8 °C), temperature source Oral, resp. rate 20, height 162.6 cm (64\"), unknown if currently breastfeeding.      General:    alert, appears stated age and cooperative   Uterine Fundus:   firm     Labs    Rh + / Male infant    Assessment/Plan:.     Postpartum Day #2  - Discussed various issues.  Patient with scant prenatal care.  She was advised to either get appointment with our practice for postpartum visit or go to [facility] and be seen.  Discussed seeking treatment for drug addiction.  Patient states she is aware of options.  Finally, discussed contraception especially in lieu of drug issue.  I offered patient Depo Provera and she declined.    Jonh Ye MD     I spent greater than 30 minutes in floor/chart/patient time and greater than 20 minutes in counseling and discharge planning.    "

## 2017-12-14 NOTE — PROGRESS NOTES
Continued Stay Note  [facility]     Patient Name: Ariadne Haley  MRN: [id number]  Today's Date: 12/14/2017    Admit Date: 12/11/2017          Discharge Plan       12/14/17 0923    Case Management/Social Work Plan    Plan Home     Additional Comments CCP placed two forms and prevention plan on baby's chart. CCP will follow. Nellie NORRIS               Discharge Codes     None        Expected Discharge Date and Time     Expected Discharge Date Expected Discharge Time    Dec 13, 2017             MYRNA Aparicio

## 2021-01-01 NOTE — ANESTHESIA PROCEDURE NOTES
Labor Epidural    Patient location during procedure: OB  Performed By  Anesthesiologist: MARTA ARREGUIN  Preanesthetic Checklist  Completed: patient identified, site marked, surgical consent, pre-op evaluation, timeout performed, IV checked, risks and benefits discussed and monitors and equipment checked  Additional Notes  Test dose 3 cc 1.5% lidocaine with epi.  Second dose 5cc 1% lidocaine then continuous infusion o.2% Ropivicaine with 2 Mics fentanyl per cc at 10 cc hr, with  6cc PCA, 15 min lockout  Prep:  Pt Position:sitting  Sterile Tech:gloves, cap, mask and sterile barrier  Prep:chlorhexidine gluconate and isopropyl alcohol  Monitoring:blood pressure monitoring, continuous pulse oximetry and EKG  Epidural Block Procedure:  Approach:midline  Guidance:landmark technique  Location:L4-L5  Needle Type:Tuohy  Needle Gauge:18  Loss of Resistance Medium: air  Paresthesia: none  Aspiration:negative  Test Dose:negative  Number of Attempts: 1  Post Assessment:  Dressing:occlusive dressing applied and secured with tape  Pt Tolerance:patient tolerated the procedure well with no apparent complications  Complications:no            
Adequate: hears normal conversation without difficulty

## 2023-06-14 ENCOUNTER — HOSPITAL ENCOUNTER (EMERGENCY)
Facility: HOSPITAL | Age: 33
Discharge: HOME | End: 2023-06-14
Attending: EMERGENCY MEDICINE | Admitting: EMERGENCY MEDICINE
Payer: COMMERCIAL

## 2023-06-14 ENCOUNTER — APPOINTMENT (OUTPATIENT)
Dept: GENERAL RADIOLOGY | Facility: HOSPITAL | Age: 33
End: 2023-06-14
Payer: COMMERCIAL

## 2023-06-14 VITALS
HEART RATE: 87 BPM | TEMPERATURE: 100 F | HEIGHT: 64 IN | WEIGHT: 220 LBS | OXYGEN SATURATION: 95 % | RESPIRATION RATE: 14 BRPM | SYSTOLIC BLOOD PRESSURE: 122 MMHG | BODY MASS INDEX: 37.56 KG/M2 | DIASTOLIC BLOOD PRESSURE: 78 MMHG

## 2023-06-14 DIAGNOSIS — F19.20 DRUG ABUSE AND DEPENDENCE: Primary | ICD-10-CM

## 2023-06-14 DIAGNOSIS — T50.901A ACUTE DRUG OVERDOSE, ACCIDENTAL OR UNINTENTIONAL, INITIAL ENCOUNTER: ICD-10-CM

## 2023-06-14 LAB
ALBUMIN SERPL-MCNC: 4.2 G/DL (ref 3.5–5.2)
ALBUMIN/GLOB SERPL: 1.6 G/DL
ALP SERPL-CCNC: 64 U/L (ref 39–117)
ALT SERPL W P-5'-P-CCNC: 89 U/L (ref 1–33)
AMPHET+METHAMPHET UR QL: NEGATIVE
ANION GAP SERPL CALCULATED.3IONS-SCNC: 9.7 MMOL/L (ref 5–15)
APTT PPP: 25.6 SECONDS (ref 22.7–35.4)
AST SERPL-CCNC: 96 U/L (ref 1–32)
ATMOSPHERIC PRESS: 740.3 MMHG
BACTERIA UR QL AUTO: ABNORMAL /HPF
BARBITURATES UR QL SCN: NEGATIVE
BASE EXCESS BLDV CALC-SCNC: -0.4 MMOL/L (ref -2–2)
BASOPHILS # BLD AUTO: 0.01 10*3/MM3 (ref 0–0.2)
BASOPHILS NFR BLD AUTO: 0.1 % (ref 0–1.5)
BDY SITE: ABNORMAL
BENZODIAZ UR QL SCN: NEGATIVE
BILIRUB SERPL-MCNC: <0.2 MG/DL (ref 0–1.2)
BILIRUB UR QL STRIP: NEGATIVE
BUN SERPL-MCNC: 14 MG/DL (ref 6–20)
BUN/CREAT SERPL: 20.3 (ref 7–25)
CALCIUM SPEC-SCNC: 8.7 MG/DL (ref 8.6–10.5)
CANNABINOIDS SERPL QL: NEGATIVE
CHLORIDE SERPL-SCNC: 107 MMOL/L (ref 98–107)
CK SERPL-CCNC: 1911 U/L (ref 20–180)
CLARITY UR: CLEAR
CO2 SERPL-SCNC: 25.3 MMOL/L (ref 22–29)
COCAINE UR QL: POSITIVE
COLOR UR: YELLOW
CREAT SERPL-MCNC: 0.69 MG/DL (ref 0.57–1)
D-LACTATE SERPL-SCNC: 1.2 MMOL/L (ref 0.5–2)
DEPRECATED RDW RBC AUTO: 39.4 FL (ref 37–54)
EGFRCR SERPLBLD CKD-EPI 2021: 117.7 ML/MIN/1.73
EOSINOPHIL # BLD AUTO: 0.16 10*3/MM3 (ref 0–0.4)
EOSINOPHIL NFR BLD AUTO: 2.3 % (ref 0.3–6.2)
ERYTHROCYTE [DISTWIDTH] IN BLOOD BY AUTOMATED COUNT: 11.9 % (ref 12.3–15.4)
ETHANOL BLD-MCNC: <10 MG/DL (ref 0–10)
ETHANOL UR QL: <0.01 %
FENTANYL UR-MCNC: POSITIVE NG/ML
GAS FLOW AIRWAY: 2 LPM
GLOBULIN UR ELPH-MCNC: 2.6 GM/DL
GLUCOSE SERPL-MCNC: 153 MG/DL (ref 65–99)
GLUCOSE UR STRIP-MCNC: ABNORMAL MG/DL
HCG SERPL QL: NEGATIVE
HCO3 BLDV-SCNC: 25.8 MMOL/L (ref 22–28)
HCT VFR BLD AUTO: 31.1 % (ref 34–46.6)
HGB BLD-MCNC: 10.7 G/DL (ref 12–15.9)
HGB UR QL STRIP.AUTO: NEGATIVE
HYALINE CASTS UR QL AUTO: ABNORMAL /LPF
IMM GRANULOCYTES # BLD AUTO: 0.03 10*3/MM3 (ref 0–0.05)
IMM GRANULOCYTES NFR BLD AUTO: 0.4 % (ref 0–0.5)
INR PPP: 1.05 (ref 0.9–1.1)
KETONES UR QL STRIP: NEGATIVE
LEUKOCYTE ESTERASE UR QL STRIP.AUTO: NEGATIVE
LIPASE SERPL-CCNC: 22 U/L (ref 13–60)
LYMPHOCYTES # BLD AUTO: 1.03 10*3/MM3 (ref 0.7–3.1)
LYMPHOCYTES NFR BLD AUTO: 14.9 % (ref 19.6–45.3)
MCH RBC QN AUTO: 31.5 PG (ref 26.6–33)
MCHC RBC AUTO-ENTMCNC: 34.4 G/DL (ref 31.5–35.7)
MCV RBC AUTO: 91.5 FL (ref 79–97)
METHADONE UR QL SCN: NEGATIVE
MODALITY: ABNORMAL
MONOCYTES # BLD AUTO: 0.56 10*3/MM3 (ref 0.1–0.9)
MONOCYTES NFR BLD AUTO: 8.1 % (ref 5–12)
NEUTROPHILS NFR BLD AUTO: 5.13 10*3/MM3 (ref 1.7–7)
NEUTROPHILS NFR BLD AUTO: 74.2 % (ref 42.7–76)
NITRITE UR QL STRIP: NEGATIVE
NRBC BLD AUTO-RTO: 0 /100 WBC (ref 0–0.2)
OPIATES UR QL: NEGATIVE
OXYCODONE UR QL SCN: NEGATIVE
PCO2 BLDV: 48 MM HG (ref 41–51)
PH BLDV: 7.34 PH UNITS (ref 7.31–7.41)
PH UR STRIP.AUTO: 6 [PH] (ref 5–8)
PLATELET # BLD AUTO: 189 10*3/MM3 (ref 140–450)
PMV BLD AUTO: 10.4 FL (ref 6–12)
PO2 BLDV: 48.1 MM HG (ref 35–45)
POTASSIUM SERPL-SCNC: 3.1 MMOL/L (ref 3.5–5.2)
PROCALCITONIN SERPL-MCNC: 0.05 NG/ML (ref 0–0.25)
PROT SERPL-MCNC: 6.8 G/DL (ref 6–8.5)
PROT UR QL STRIP: ABNORMAL
PROTHROMBIN TIME: 13.8 SECONDS (ref 11.7–14.2)
RBC # BLD AUTO: 3.4 10*6/MM3 (ref 3.77–5.28)
RBC # UR STRIP: ABNORMAL /HPF
REF LAB TEST METHOD: ABNORMAL
SAO2 % BLDCOA: 80.5 % (ref 92–99)
SODIUM SERPL-SCNC: 142 MMOL/L (ref 136–145)
SP GR UR STRIP: >=1.03 (ref 1–1.03)
SQUAMOUS #/AREA URNS HPF: ABNORMAL /HPF
TOTAL RATE: 12 BREATHS/MINUTE
TRANS CELLS #/AREA URNS HPF: ABNORMAL /HPF
UROBILINOGEN UR QL STRIP: ABNORMAL
WBC # UR STRIP: ABNORMAL /HPF
WBC NRBC COR # BLD: 6.92 10*3/MM3 (ref 3.4–10.8)

## 2023-06-14 PROCEDURE — 83690 ASSAY OF LIPASE: CPT | Performed by: EMERGENCY MEDICINE

## 2023-06-14 PROCEDURE — 80307 DRUG TEST PRSMV CHEM ANLYZR: CPT | Performed by: EMERGENCY MEDICINE

## 2023-06-14 PROCEDURE — 85610 PROTHROMBIN TIME: CPT | Performed by: EMERGENCY MEDICINE

## 2023-06-14 PROCEDURE — 71045 X-RAY EXAM CHEST 1 VIEW: CPT

## 2023-06-14 PROCEDURE — 82077 ASSAY SPEC XCP UR&BREATH IA: CPT | Performed by: EMERGENCY MEDICINE

## 2023-06-14 PROCEDURE — 96360 HYDRATION IV INFUSION INIT: CPT

## 2023-06-14 PROCEDURE — 81001 URINALYSIS AUTO W/SCOPE: CPT | Performed by: EMERGENCY MEDICINE

## 2023-06-14 PROCEDURE — 87040 BLOOD CULTURE FOR BACTERIA: CPT | Performed by: EMERGENCY MEDICINE

## 2023-06-14 PROCEDURE — 85025 COMPLETE CBC W/AUTO DIFF WBC: CPT | Performed by: EMERGENCY MEDICINE

## 2023-06-14 PROCEDURE — 84703 CHORIONIC GONADOTROPIN ASSAY: CPT | Performed by: EMERGENCY MEDICINE

## 2023-06-14 PROCEDURE — 82550 ASSAY OF CK (CPK): CPT | Performed by: EMERGENCY MEDICINE

## 2023-06-14 PROCEDURE — 83605 ASSAY OF LACTIC ACID: CPT | Performed by: EMERGENCY MEDICINE

## 2023-06-14 PROCEDURE — 85730 THROMBOPLASTIN TIME PARTIAL: CPT | Performed by: EMERGENCY MEDICINE

## 2023-06-14 PROCEDURE — 96361 HYDRATE IV INFUSION ADD-ON: CPT

## 2023-06-14 PROCEDURE — 82803 BLOOD GASES ANY COMBINATION: CPT

## 2023-06-14 PROCEDURE — 99284 EMERGENCY DEPT VISIT MOD MDM: CPT

## 2023-06-14 PROCEDURE — 80053 COMPREHEN METABOLIC PANEL: CPT | Performed by: EMERGENCY MEDICINE

## 2023-06-14 PROCEDURE — 84145 PROCALCITONIN (PCT): CPT | Performed by: EMERGENCY MEDICINE

## 2023-06-14 PROCEDURE — 36415 COLL VENOUS BLD VENIPUNCTURE: CPT

## 2023-06-14 RX ORDER — SODIUM CHLORIDE 0.9 % (FLUSH) 0.9 %
10 SYRINGE (ML) INJECTION AS NEEDED
Status: DISCONTINUED | OUTPATIENT
Start: 2023-06-14 | End: 2023-06-15 | Stop reason: HOSPADM

## 2023-06-14 RX ORDER — SODIUM CHLORIDE 9 MG/ML
125 INJECTION, SOLUTION INTRAVENOUS CONTINUOUS
Status: DISCONTINUED | OUTPATIENT
Start: 2023-06-14 | End: 2023-06-15 | Stop reason: HOSPADM

## 2023-06-14 RX ADMIN — SODIUM CHLORIDE 1000 ML: 9 INJECTION, SOLUTION INTRAVENOUS at 16:11

## 2023-06-14 RX ADMIN — SODIUM CHLORIDE 125 ML/HR: 9 INJECTION, SOLUTION INTRAVENOUS at 16:18

## 2023-06-14 NOTE — ED PROVIDER NOTES
EMERGENCY DEPARTMENT ENCOUNTER    Room Number:  33/33  Date seen:  2023  PCP: Provider, No Known  Historian: EMS and patient  Relevant information provided by sources other than the patient, review of external records, and social determinants of health may be included in the HPI section.      HPI:  Chief Complaint: Unintentional overdose  Additional historical features obtained directly from: EMS reports that they responded to the scene for an unresponsive patient outside a liquor store in her car.  They arrived to find her unresponsive with agonal respirations and she was given Narcan.  She had immediate response on became more alert and was brought for evaluation.  Context: Ariadne Haley is a 33 y.o. female who presents to the ED c/o unintentional overdose on fentanyl according to the patient.  She has a history of polysubstance abuse, and have been clean now for some time and then had a relapse today according to her.  She said that it was recreational use and she is not trying to harm herself but clearly overdose.  She is currently pretty somnolent and is in and out of the conversation but she is easily arousable and seems oriented until she drifts off to sleep again.        Initial impression including social determinants which will impact the assessment social determinants limiting the encounter are history of drug and alcohol abuse, oversedation with limited history and possibility of coingestion.  We will need to monitor her to sobriety, check for any coingestions or toxicities, monitor the airway and supportive care plus or minus psychiatric evaluation.  Admit to hospital certainly possibility          PAST MEDICAL HISTORY  Active Ambulatory Problems     Diagnosis Date Noted   •  labor in third trimester 2017   • Insufficient prenatal care in third trimester 2017   • Drug use affecting pregnancy in third trimester 2017   •  delivery, delivered 2017     Resolved  Ambulatory Problems     Diagnosis Date Noted   • No Resolved Ambulatory Problems     Past Medical History:   Diagnosis Date   • Alcoholism    • Anxiety    • Substance abuse          PAST SURGICAL HISTORY  Past Surgical History:   Procedure Laterality Date   • WISDOM TOOTH EXTRACTION           FAMILY HISTORY  Family History   Problem Relation Age of Onset   • Hypertension Father    • Hypertension Mother    • Hypertension Paternal Grandfather    • Hypertension Paternal Grandmother    • Diabetes Paternal Grandmother    • Colon cancer Paternal Aunt          SOCIAL HISTORY  Social History     Socioeconomic History   • Marital status: Unknown   Tobacco Use   • Smoking status: Every Day     Packs/day: 0.50     Types: Cigarettes   • Tobacco comments:     0.5 ppd for 12 yrs   Substance and Sexual Activity   • Alcohol use: Yes     Comment: none since 9/2017 has drank frequently in the past.  most in one day 4   • Drug use: Yes     Types: Marijuana, Heroin   • Sexual activity: Yes         ALLERGIES  Patient has no known allergies.          PHYSICAL EXAM  ED Triage Vitals [06/14/23 1545]   Temp Heart Rate Resp BP SpO2   100 °F (37.8 °C) 98 18 120/68 98 %      Temp src Heart Rate Source Patient Position BP Location FiO2 (%)   -- Monitor -- -- --         Physical Exam      GENERAL: Somnolent but arousable, appears overmedicated but is able to interact when she is awake  HENT: nares patent, NCAT, neck nontender  EYES: no scleral icterus, pupils are small and sluggish but reactive  CV: regular rhythm, normal rate, distal pulses symmetric and palpable  RESPIRATORY: normal effort  ABDOMEN: soft, nondistended nontender with normal bowel sound, airways intact, no respiratory distress  MUSCULOSKELETAL: no deformity  NEURO: As above, moves all extremities equally and follows commands before drifting back off to sleep.  I do not see any focal deficits  PSYCH: Not suicidal or homicidal, affect is flat now but is overmedicated at this  "time.  SKIN: warm, dry with no rash        LAB RESULTS  No results found for this or any previous visit (from the past 24 hour(s)).    Ordered the above labs and my independent interpretation of these results are discussed in the section labeled \"ED course\" below        RADIOLOGY  No Radiology Exams Resulted Within Past 24 Hours    Ordered the above noted radiological studies.  Independently interpreted by me in PACS.  My independent interpretation of these results are discussed in the section below labeled \"ED course\"        PROCEDURES  Procedures          MEDICATIONS GIVEN IN ER  Medications   sodium chloride 0.9 % bolus 1,000 mL (0 mL Intravenous Stopped 6/14/23 1810)                   MEDICAL DECISION MAKING and INDEPENDENT INTERPRETATIONS      Everything documented below this statement is the \"ED course\" section which I have referred to above.  Everything discussed in the following section constitutes MY INDEPENDENT INTERPRETATIONS of the lab work, EKGs, and radiology studies, and all of my personal conversations with other providers, and all of my independent decision making regarding this patient are discussed below.      ED Course as of 06/16/23 1915 Fri Jun 16, 2023 1913 CBC shows normal white blood cell count and a mild chronic anemia [DP]   1913 Chemistry shows normal renal function with slightly elevated transaminases which appear to be chronic compared to previous [DP]   1914 ABG was a bad sample and mixed blood and not accurate but the pH was at least normal [DP]   1914 Urinalysis is unremarkable [DP]   1914 Drug screen positive for cocaine and fentanyl [DP]   1914 Chest x-ray shows no acute abnormalities [DP]   1914 Patient rested on the monitor for several hours, and when I went to check on her she was awake and alert and talking.  She was able to get up and walk on her own with steady gait and tolerate p.o., there is no respiratory distress and she was no longer febrile [DP]   1915 I think the " "slightly elevated temperature on arrival was likely due to exposure of sitting in the car for a while.  She did have a CK of 2000, but her renal function was not affected and she was hydrated with 2 units of fluids while here which should be adequate. [DP]   1915 Patient again is not suicidal or homicidal, does not really want help with subs abuse counseling at this time, and has a sober ride to take her home. [DP]      ED Course User Index  [DP] Raza Ramsey MD         Everything documented above with this statement, in the ED course section constitutes my independent interpretation of lab work EKG and radiology studies along with my personal conversations with other providers and my independent medical decision making.  This statement will not be repeated before each data point, but they are all my independent interpretation needs contained within the \"ED course\" section.             All appropriate hygiene and PPE requirements were satisfied with this patient encounter      FINAL DIAGNOSES  Final diagnoses:   Drug abuse and dependence   Acute drug overdose, accidental or unintentional, initial encounter           DISPOSITION  Discharge          Latest Documented Vital Signs:  As of 19:13 EDT  BP- 122/78 HR- 87 Temp- 100 °F (37.8 °C) O2 sat- 95%        --    Please note that portions of this were completed with a voice recognition program.       Note Disclaimer: At [facility], we believe that sharing information builds trust and better relationships. You are receiving this note because you are receiving care at [facility] or recently visited. It is possible you will see health information before a provider has talked with you about it. This kind of information can be easy to misunderstand. To help you fully understand what it      Raza Ramsey MD  06/16/23 1916    "

## 2023-06-14 NOTE — ED NOTES
Pt to ED from the [employer] parking lot. Pt was in a car and took fentanyl. Pt has been clean for a few months and took today. Pt was unresponsive and agonal resps on scene. Pt was give 2mg nasal narcan without change. Pt given 1mg narcan IV and 4mg zofran and pt woke up. Pt was a/ox4 wit EMS. Pt asleep in triage. Pt is on 2Lo2 at this time. Pt alert to verbal stimuli.

## 2023-06-19 LAB
BACTERIA SPEC AEROBE CULT: NORMAL
BACTERIA SPEC AEROBE CULT: NORMAL